# Patient Record
Sex: FEMALE | Race: OTHER | NOT HISPANIC OR LATINO | Employment: UNEMPLOYED | URBAN - METROPOLITAN AREA
[De-identification: names, ages, dates, MRNs, and addresses within clinical notes are randomized per-mention and may not be internally consistent; named-entity substitution may affect disease eponyms.]

---

## 2022-01-27 ENCOUNTER — TELEPHONE (OUTPATIENT)
Dept: ADMINISTRATIVE | Facility: OTHER | Age: 27
End: 2022-01-27
Payer: MEDICAID

## 2022-01-28 ENCOUNTER — OFFICE VISIT (OUTPATIENT)
Dept: OBSTETRICS AND GYNECOLOGY | Facility: CLINIC | Age: 27
End: 2022-01-28
Payer: MEDICAID

## 2022-01-28 ENCOUNTER — HOSPITAL ENCOUNTER (INPATIENT)
Facility: HOSPITAL | Age: 27
LOS: 2 days | Discharge: HOME | End: 2022-01-30
Attending: OBSTETRICS & GYNECOLOGY | Admitting: OBSTETRICS & GYNECOLOGY
Payer: MEDICAID

## 2022-01-28 ENCOUNTER — ANESTHESIA EVENT (OUTPATIENT)
Dept: OBSTETRICS AND GYNECOLOGY | Facility: HOSPITAL | Age: 27
End: 2022-01-28
Payer: MEDICAID

## 2022-01-28 ENCOUNTER — ANESTHESIA (OUTPATIENT)
Dept: OBSTETRICS AND GYNECOLOGY | Facility: HOSPITAL | Age: 27
End: 2022-01-28
Payer: MEDICAID

## 2022-01-28 VITALS — DIASTOLIC BLOOD PRESSURE: 80 MMHG | SYSTOLIC BLOOD PRESSURE: 120 MMHG | WEIGHT: 135.81 LBS

## 2022-01-28 DIAGNOSIS — Z12.4 CERVICAL CANCER SCREENING: ICD-10-CM

## 2022-01-28 DIAGNOSIS — Z3A.39 39 WEEKS GESTATION OF PREGNANCY: ICD-10-CM

## 2022-01-28 DIAGNOSIS — Z3A.35 35 WEEKS GESTATION OF PREGNANCY: Primary | ICD-10-CM

## 2022-01-28 LAB
ABO + RH BLD: NORMAL
BLD GP AB SCN CELLS X3 SERPL QL: NORMAL
HIV1+2 IGG SERPL QL IA.RAPID: NORMAL
SARS-COV-2 RDRP RESP QL NAA+PROBE: NEGATIVE

## 2022-01-28 PROCEDURE — 63600175 PHARM REV CODE 636 W HCPCS: Performed by: OBSTETRICS & GYNECOLOGY

## 2022-01-28 PROCEDURE — 59514 PR CESAREAN DELIVERY ONLY: ICD-10-PCS | Mod: GB,,, | Performed by: OBSTETRICS & GYNECOLOGY

## 2022-01-28 PROCEDURE — 87088 URINE BACTERIA CULTURE: CPT | Performed by: OBSTETRICS & GYNECOLOGY

## 2022-01-28 PROCEDURE — 37000009 HC ANESTHESIA EA ADD 15 MINS: Performed by: OBSTETRICS & GYNECOLOGY

## 2022-01-28 PROCEDURE — 99204 OFFICE O/P NEW MOD 45 MIN: CPT | Mod: S$PBB,TH,, | Performed by: OBSTETRICS & GYNECOLOGY

## 2022-01-28 PROCEDURE — 88141 PR  CYTOPATH CERV/VAG INTERPRET: ICD-10-PCS | Mod: ,,, | Performed by: PATHOLOGY

## 2022-01-28 PROCEDURE — 87801 DETECT AGNT MULT DNA AMPLI: CPT | Performed by: OBSTETRICS & GYNECOLOGY

## 2022-01-28 PROCEDURE — 3074F PR MOST RECENT SYSTOLIC BLOOD PRESSURE < 130 MM HG: ICD-10-PCS | Mod: CPTII,,, | Performed by: OBSTETRICS & GYNECOLOGY

## 2022-01-28 PROCEDURE — 25000003 PHARM REV CODE 250: Performed by: STUDENT IN AN ORGANIZED HEALTH CARE EDUCATION/TRAINING PROGRAM

## 2022-01-28 PROCEDURE — 86592 SYPHILIS TEST NON-TREP QUAL: CPT | Mod: 91 | Performed by: OBSTETRICS & GYNECOLOGY

## 2022-01-28 PROCEDURE — 62322 NJX INTERLAMINAR LMBR/SAC: CPT | Performed by: STUDENT IN AN ORGANIZED HEALTH CARE EDUCATION/TRAINING PROGRAM

## 2022-01-28 PROCEDURE — 3074F SYST BP LT 130 MM HG: CPT | Mod: CPTII,,, | Performed by: OBSTETRICS & GYNECOLOGY

## 2022-01-28 PROCEDURE — 88175 CYTOPATH C/V AUTO FLUID REDO: CPT | Performed by: PATHOLOGY

## 2022-01-28 PROCEDURE — 59514 CESAREAN DELIVERY ONLY: CPT | Mod: GB,,, | Performed by: OBSTETRICS & GYNECOLOGY

## 2022-01-28 PROCEDURE — 99999 PR PBB SHADOW E&M-EST. PATIENT-LVL II: CPT | Mod: PBBFAC,,, | Performed by: OBSTETRICS & GYNECOLOGY

## 2022-01-28 PROCEDURE — 3079F PR MOST RECENT DIASTOLIC BLOOD PRESSURE 80-89 MM HG: ICD-10-PCS | Mod: CPTII,,, | Performed by: OBSTETRICS & GYNECOLOGY

## 2022-01-28 PROCEDURE — 27200688 HC TRAY, SPINAL-HYPER/ ISOBARIC: Performed by: ANESTHESIOLOGY

## 2022-01-28 PROCEDURE — 51702 INSERT TEMP BLADDER CATH: CPT

## 2022-01-28 PROCEDURE — 88141 CYTOPATH C/V INTERPRET: CPT | Mod: ,,, | Performed by: PATHOLOGY

## 2022-01-28 PROCEDURE — 99212 OFFICE O/P EST SF 10 MIN: CPT | Mod: PBBFAC,TH,PO | Performed by: OBSTETRICS & GYNECOLOGY

## 2022-01-28 PROCEDURE — 25000003 PHARM REV CODE 250

## 2022-01-28 PROCEDURE — 3044F PR MOST RECENT HEMOGLOBIN A1C LEVEL <7.0%: ICD-10-PCS | Mod: CPTII,,, | Performed by: OBSTETRICS & GYNECOLOGY

## 2022-01-28 PROCEDURE — 87077 CULTURE AEROBIC IDENTIFY: CPT | Performed by: OBSTETRICS & GYNECOLOGY

## 2022-01-28 PROCEDURE — 25000003 PHARM REV CODE 250: Performed by: OBSTETRICS & GYNECOLOGY

## 2022-01-28 PROCEDURE — U0002 COVID-19 LAB TEST NON-CDC: HCPCS | Performed by: OBSTETRICS & GYNECOLOGY

## 2022-01-28 PROCEDURE — 36004725 HC OB OR TIME LEV III - EA ADD 15 MIN: Performed by: OBSTETRICS & GYNECOLOGY

## 2022-01-28 PROCEDURE — 87186 SC STD MICRODIL/AGAR DIL: CPT | Performed by: OBSTETRICS & GYNECOLOGY

## 2022-01-28 PROCEDURE — 86850 RBC ANTIBODY SCREEN: CPT | Performed by: OBSTETRICS & GYNECOLOGY

## 2022-01-28 PROCEDURE — 87491 CHLMYD TRACH DNA AMP PROBE: CPT | Mod: 59 | Performed by: OBSTETRICS & GYNECOLOGY

## 2022-01-28 PROCEDURE — 3079F DIAST BP 80-89 MM HG: CPT | Mod: CPTII,,, | Performed by: OBSTETRICS & GYNECOLOGY

## 2022-01-28 PROCEDURE — 87086 URINE CULTURE/COLONY COUNT: CPT | Performed by: OBSTETRICS & GYNECOLOGY

## 2022-01-28 PROCEDURE — 36415 COLL VENOUS BLD VENIPUNCTURE: CPT | Performed by: OBSTETRICS & GYNECOLOGY

## 2022-01-28 PROCEDURE — 63600175 PHARM REV CODE 636 W HCPCS: Performed by: STUDENT IN AN ORGANIZED HEALTH CARE EDUCATION/TRAINING PROGRAM

## 2022-01-28 PROCEDURE — 86762 RUBELLA ANTIBODY: CPT | Performed by: OBSTETRICS & GYNECOLOGY

## 2022-01-28 PROCEDURE — 3044F HG A1C LEVEL LT 7.0%: CPT | Mod: CPTII,,, | Performed by: OBSTETRICS & GYNECOLOGY

## 2022-01-28 PROCEDURE — 11000001 HC ACUTE MED/SURG PRIVATE ROOM

## 2022-01-28 PROCEDURE — 87624 HPV HI-RISK TYP POOLED RSLT: CPT | Performed by: OBSTETRICS & GYNECOLOGY

## 2022-01-28 PROCEDURE — 87340 HEPATITIS B SURFACE AG IA: CPT | Performed by: OBSTETRICS & GYNECOLOGY

## 2022-01-28 PROCEDURE — 87481 CANDIDA DNA AMP PROBE: CPT | Mod: 59 | Performed by: OBSTETRICS & GYNECOLOGY

## 2022-01-28 PROCEDURE — 37000008 HC ANESTHESIA 1ST 15 MINUTES: Performed by: OBSTETRICS & GYNECOLOGY

## 2022-01-28 PROCEDURE — 87591 N.GONORRHOEAE DNA AMP PROB: CPT | Mod: 59 | Performed by: OBSTETRICS & GYNECOLOGY

## 2022-01-28 PROCEDURE — 99204 PR OFFICE/OUTPT VISIT, NEW, LEVL IV, 45-59 MIN: ICD-10-PCS | Mod: S$PBB,TH,, | Performed by: OBSTETRICS & GYNECOLOGY

## 2022-01-28 PROCEDURE — 86703 HIV-1/HIV-2 1 RESULT ANTBDY: CPT | Performed by: OBSTETRICS & GYNECOLOGY

## 2022-01-28 PROCEDURE — 99999 PR PBB SHADOW E&M-EST. PATIENT-LVL II: ICD-10-PCS | Mod: PBBFAC,,, | Performed by: OBSTETRICS & GYNECOLOGY

## 2022-01-28 PROCEDURE — 36004724 HC OB OR TIME LEV III - 1ST 15 MIN: Performed by: OBSTETRICS & GYNECOLOGY

## 2022-01-28 RX ORDER — DIPHENHYDRAMINE HCL 25 MG
25 CAPSULE ORAL EVERY 4 HOURS PRN
Status: DISCONTINUED | OUTPATIENT
Start: 2022-01-28 | End: 2022-01-30 | Stop reason: HOSPADM

## 2022-01-28 RX ORDER — ADHESIVE BANDAGE
30 BANDAGE TOPICAL 2 TIMES DAILY PRN
Status: DISCONTINUED | OUTPATIENT
Start: 2022-01-29 | End: 2022-01-30 | Stop reason: HOSPADM

## 2022-01-28 RX ORDER — OXYTOCIN/RINGER'S LACTATE 30/500 ML
95 PLASTIC BAG, INJECTION (ML) INTRAVENOUS ONCE
Status: DISCONTINUED | OUTPATIENT
Start: 2022-01-28 | End: 2022-01-30 | Stop reason: HOSPADM

## 2022-01-28 RX ORDER — OXYTOCIN/RINGER'S LACTATE 30/500 ML
95 PLASTIC BAG, INJECTION (ML) INTRAVENOUS ONCE
Status: DISCONTINUED | OUTPATIENT
Start: 2022-01-28 | End: 2022-01-28

## 2022-01-28 RX ORDER — CARBOPROST TROMETHAMINE 250 UG/ML
250 INJECTION, SOLUTION INTRAMUSCULAR
Status: DISCONTINUED | OUTPATIENT
Start: 2022-01-28 | End: 2022-01-28

## 2022-01-28 RX ORDER — PROCHLORPERAZINE EDISYLATE 5 MG/ML
5 INJECTION INTRAMUSCULAR; INTRAVENOUS EVERY 6 HOURS PRN
Status: DISCONTINUED | OUTPATIENT
Start: 2022-01-28 | End: 2022-01-30 | Stop reason: HOSPADM

## 2022-01-28 RX ORDER — DOCUSATE SODIUM 100 MG/1
200 CAPSULE, LIQUID FILLED ORAL 2 TIMES DAILY
Status: DISCONTINUED | OUTPATIENT
Start: 2022-01-28 | End: 2022-01-30 | Stop reason: HOSPADM

## 2022-01-28 RX ORDER — OXYCODONE HYDROCHLORIDE 5 MG/1
10 TABLET ORAL EVERY 4 HOURS PRN
Status: ACTIVE | OUTPATIENT
Start: 2022-01-28 | End: 2022-01-29

## 2022-01-28 RX ORDER — FAMOTIDINE 10 MG/ML
20 INJECTION INTRAVENOUS
Status: DISCONTINUED | OUTPATIENT
Start: 2022-01-28 | End: 2022-01-28

## 2022-01-28 RX ORDER — SODIUM CITRATE AND CITRIC ACID MONOHYDRATE 334; 500 MG/5ML; MG/5ML
30 SOLUTION ORAL ONCE
Status: DISCONTINUED | OUTPATIENT
Start: 2022-01-28 | End: 2022-01-28

## 2022-01-28 RX ORDER — FAMOTIDINE 10 MG/ML
20 INJECTION INTRAVENOUS ONCE
Status: DISCONTINUED | OUTPATIENT
Start: 2022-01-28 | End: 2022-01-28

## 2022-01-28 RX ORDER — ONDANSETRON 2 MG/ML
4 INJECTION INTRAMUSCULAR; INTRAVENOUS EVERY 6 HOURS PRN
Status: ACTIVE | OUTPATIENT
Start: 2022-01-28 | End: 2022-01-29

## 2022-01-28 RX ORDER — SODIUM CHLORIDE 9 MG/ML
INJECTION, SOLUTION INTRAVENOUS CONTINUOUS
Status: DISCONTINUED | OUTPATIENT
Start: 2022-01-28 | End: 2022-01-30 | Stop reason: HOSPADM

## 2022-01-28 RX ORDER — ONDANSETRON HYDROCHLORIDE 2 MG/ML
INJECTION, SOLUTION INTRAMUSCULAR; INTRAVENOUS
Status: DISCONTINUED | OUTPATIENT
Start: 2022-01-28 | End: 2022-01-28

## 2022-01-28 RX ORDER — MORPHINE SULFATE 4 MG/ML
2 INJECTION, SOLUTION INTRAMUSCULAR; INTRAVENOUS
Status: ACTIVE | OUTPATIENT
Start: 2022-01-28 | End: 2022-01-29

## 2022-01-28 RX ORDER — CEFAZOLIN SODIUM 2 G/50ML
2 SOLUTION INTRAVENOUS
Status: COMPLETED | OUTPATIENT
Start: 2022-01-28 | End: 2022-01-28

## 2022-01-28 RX ORDER — KETOROLAC TROMETHAMINE 30 MG/ML
INJECTION, SOLUTION INTRAMUSCULAR; INTRAVENOUS
Status: DISCONTINUED | OUTPATIENT
Start: 2022-01-28 | End: 2022-01-28

## 2022-01-28 RX ORDER — MUPIROCIN 20 MG/G
OINTMENT TOPICAL
Status: DISCONTINUED | OUTPATIENT
Start: 2022-01-28 | End: 2022-01-30 | Stop reason: HOSPADM

## 2022-01-28 RX ORDER — FENTANYL CITRATE 50 UG/ML
INJECTION, SOLUTION INTRAMUSCULAR; INTRAVENOUS
Status: DISCONTINUED | OUTPATIENT
Start: 2022-01-28 | End: 2022-01-28

## 2022-01-28 RX ORDER — NALOXONE HCL 0.4 MG/ML
0.04 VIAL (ML) INJECTION EVERY 5 MIN PRN
Status: DISCONTINUED | OUTPATIENT
Start: 2022-01-28 | End: 2022-01-30 | Stop reason: HOSPADM

## 2022-01-28 RX ORDER — BUPIVACAINE HYDROCHLORIDE 7.5 MG/ML
INJECTION, SOLUTION INTRASPINAL
Status: DISCONTINUED | OUTPATIENT
Start: 2022-01-28 | End: 2022-01-28

## 2022-01-28 RX ORDER — BISACODYL 10 MG
10 SUPPOSITORY, RECTAL RECTAL ONCE AS NEEDED
Status: DISCONTINUED | OUTPATIENT
Start: 2022-01-28 | End: 2022-01-30 | Stop reason: HOSPADM

## 2022-01-28 RX ORDER — HYDROMORPHONE HYDROCHLORIDE 1 MG/ML
1 INJECTION, SOLUTION INTRAMUSCULAR; INTRAVENOUS; SUBCUTANEOUS EVERY 4 HOURS PRN
Status: CANCELLED | OUTPATIENT
Start: 2022-01-28

## 2022-01-28 RX ORDER — SODIUM CHLORIDE, SODIUM LACTATE, POTASSIUM CHLORIDE, CALCIUM CHLORIDE 600; 310; 30; 20 MG/100ML; MG/100ML; MG/100ML; MG/100ML
INJECTION, SOLUTION INTRAVENOUS CONTINUOUS
Status: DISCONTINUED | OUTPATIENT
Start: 2022-01-28 | End: 2022-01-28

## 2022-01-28 RX ORDER — MUPIROCIN 20 MG/G
OINTMENT TOPICAL
Status: COMPLETED
Start: 2022-01-28 | End: 2022-01-28

## 2022-01-28 RX ORDER — MORPHINE SULFATE 1 MG/ML
INJECTION, SOLUTION EPIDURAL; INTRATHECAL; INTRAVENOUS
Status: DISCONTINUED | OUTPATIENT
Start: 2022-01-28 | End: 2022-01-28

## 2022-01-28 RX ORDER — MISOPROSTOL 200 UG/1
800 TABLET ORAL
Status: DISCONTINUED | OUTPATIENT
Start: 2022-01-28 | End: 2022-01-28

## 2022-01-28 RX ORDER — DIPHENHYDRAMINE HYDROCHLORIDE 50 MG/ML
12.5 INJECTION INTRAMUSCULAR; INTRAVENOUS
Status: DISCONTINUED | OUTPATIENT
Start: 2022-01-28 | End: 2022-01-30 | Stop reason: HOSPADM

## 2022-01-28 RX ORDER — KETOROLAC TROMETHAMINE 30 MG/ML
30 INJECTION, SOLUTION INTRAMUSCULAR; INTRAVENOUS EVERY 6 HOURS
Status: DISCONTINUED | OUTPATIENT
Start: 2022-01-28 | End: 2022-01-28

## 2022-01-28 RX ORDER — AMOXICILLIN 250 MG
1 CAPSULE ORAL NIGHTLY PRN
Status: CANCELLED | OUTPATIENT
Start: 2022-01-28

## 2022-01-28 RX ORDER — ONDANSETRON 8 MG/1
8 TABLET, ORALLY DISINTEGRATING ORAL EVERY 8 HOURS PRN
Status: DISCONTINUED | OUTPATIENT
Start: 2022-01-28 | End: 2022-01-30 | Stop reason: HOSPADM

## 2022-01-28 RX ORDER — SODIUM CITRATE AND CITRIC ACID MONOHYDRATE 334; 500 MG/5ML; MG/5ML
30 SOLUTION ORAL
Status: DISCONTINUED | OUTPATIENT
Start: 2022-01-28 | End: 2022-01-28

## 2022-01-28 RX ORDER — OXYCODONE HYDROCHLORIDE 5 MG/1
5 TABLET ORAL EVERY 4 HOURS PRN
Status: DISPENSED | OUTPATIENT
Start: 2022-01-28 | End: 2022-01-29

## 2022-01-28 RX ORDER — OXYTOCIN/RINGER'S LACTATE 30/500 ML
334 PLASTIC BAG, INJECTION (ML) INTRAVENOUS ONCE
Status: COMPLETED | OUTPATIENT
Start: 2022-01-28 | End: 2022-01-28

## 2022-01-28 RX ORDER — METHYLERGONOVINE MALEATE 0.2 MG/ML
200 INJECTION INTRAVENOUS
Status: DISCONTINUED | OUTPATIENT
Start: 2022-01-28 | End: 2022-01-28

## 2022-01-28 RX ORDER — ACETAMINOPHEN 325 MG/1
650 TABLET ORAL EVERY 6 HOURS
Status: DISPENSED | OUTPATIENT
Start: 2022-01-28 | End: 2022-01-29

## 2022-01-28 RX ADMIN — OXYCODONE 5 MG: 5 TABLET ORAL at 05:01

## 2022-01-28 RX ADMIN — DOCUSATE SODIUM 200 MG: 100 CAPSULE ORAL at 09:01

## 2022-01-28 RX ADMIN — MORPHINE SULFATE 0.15 MG: 1 INJECTION, SOLUTION EPIDURAL; INTRATHECAL; INTRAVENOUS at 01:01

## 2022-01-28 RX ADMIN — FAMOTIDINE 20 MG: 10 INJECTION, SOLUTION INTRAVENOUS at 01:01

## 2022-01-28 RX ADMIN — SODIUM CHLORIDE, SODIUM LACTATE, POTASSIUM CHLORIDE, AND CALCIUM CHLORIDE 1000 ML: .6; .31; .03; .02 INJECTION, SOLUTION INTRAVENOUS at 05:01

## 2022-01-28 RX ADMIN — KETOROLAC TROMETHAMINE 30 MG: 30 INJECTION, SOLUTION INTRAMUSCULAR; INTRAVENOUS at 01:01

## 2022-01-28 RX ADMIN — PHENYLEPHRINE HYDROCHLORIDE 50 MCG/MIN: 10 INJECTION INTRAVENOUS at 01:01

## 2022-01-28 RX ADMIN — Medication 334 MILLI-UNITS/MIN: at 01:01

## 2022-01-28 RX ADMIN — SODIUM CITRATE AND CITRIC ACID MONOHYDRATE 30 ML: 500; 334 SOLUTION ORAL at 01:01

## 2022-01-28 RX ADMIN — SODIUM CHLORIDE: 0.9 INJECTION, SOLUTION INTRAVENOUS at 09:01

## 2022-01-28 RX ADMIN — ONDANSETRON 4 MG: 2 INJECTION, SOLUTION INTRAMUSCULAR; INTRAVENOUS at 01:01

## 2022-01-28 RX ADMIN — SODIUM CHLORIDE, SODIUM LACTATE, POTASSIUM CHLORIDE, AND CALCIUM CHLORIDE: .6; .31; .03; .02 INJECTION, SOLUTION INTRAVENOUS at 01:01

## 2022-01-28 RX ADMIN — MUPIROCIN: 20 OINTMENT TOPICAL at 01:01

## 2022-01-28 RX ADMIN — BUPIVACAINE HYDROCHLORIDE 1.5 ML: 7.5 INJECTION, SOLUTION SUBARACHNOID at 01:01

## 2022-01-28 RX ADMIN — ACETAMINOPHEN 650 MG: 325 TABLET ORAL at 09:01

## 2022-01-28 RX ADMIN — CEFAZOLIN SODIUM 2 G: 2 SOLUTION INTRAVENOUS at 01:01

## 2022-01-28 RX ADMIN — FENTANYL CITRATE 15 MCG: 50 INJECTION, SOLUTION INTRAMUSCULAR; INTRAVENOUS at 01:01

## 2022-01-28 RX ADMIN — DEXTROSE 2 G: 50 INJECTION, SOLUTION INTRAVENOUS at 01:01

## 2022-01-28 NOTE — PLAN OF CARE
1245 SVE /3, bloody show noted, Dr Henderson paged to request moving  up, called back able to go back as soon as pt is ready, anesthesia at bedside, IV started, nitin-op meds given, pt moved to OR at 1310, see operative report an delivery summary.     1415 Report received from anesthesia, recovery plan reviewed with pt using video int. [id number], pt stes she will rest and see baby later tonight, Rn instructed pt on call light , will notify Rn if she needs anything or ready to visit the baby.

## 2022-01-28 NOTE — PROGRESS NOTES
OBSTETRICS /GYNECOLOGY     CC: Patient here for prenatal care .        LMP =unk    Brings US picture   21=>  7 3/7 WGA   Today 39 6/7 wga     Tiffanie Lopez is a 26 y.o. female  presents with complaint of absence of menstruation.      Fetal movements: Yes  Abdominal cramping:  Yes  Vaginal Bleeding:  No  Leakage of Fluid:  No  Passage of tissue:  No  Breast Tenderness:  No  Nausea/Vomiting:  No    Risks/History is pertinent for no prenatal care         History reviewed. No pertinent past medical history.  History reviewed. No pertinent surgical history.  Social History     Socioeconomic History    Marital status: Single   Tobacco Use    Smoking status: Never Smoker    Smokeless tobacco: Never Used   Substance and Sexual Activity    Alcohol use: Not Currently    Drug use: Never    Sexual activity: Not Currently     Partners: Male     History reviewed. No pertinent family history.  OB History    Para Term  AB Living   2 1       1   SAB IAB Ectopic Multiple Live Births           1      # Outcome Date GA Lbr Gilberto/2nd Weight Sex Delivery Anes PTL Lv   2 Current            1 Para      Vag-Spont   NEO       /80   Wt 61.6 kg (135 lb 12.9 oz)   LMP  (Approximate)     ROS:  GENERAL: Denies weight gain or weight loss. Feeling well overall.   SKIN: Denies rash or lesions.   HEAD: Denies head injury or headache.   NODES: Denies enlarged lymph nodes.   CHEST: Denies chest pain or shortness of breath.   CARDIOVASCULAR: Denies palpitations or left sided chest pain.   ABDOMEN: No abdominal pain, constipation, diarrhea, nausea, vomiting or rectal bleeding.   URINARY: No frequency, dysuria, hematuria, or burning on urination.  REPRODUCTIVE: See HPI.   BREASTS: The patient performs breast self-examination and denies pain, lumps, or nipple discharge.   HEMATOLOGIC: No easy bruisability or excessive bleeding.   MUSCULOSKELETAL: Denies joint pain or swelling.   NEUROLOGIC: Denies syncope or  weakness.   PSYCHIATRIC: Denies depression, anxiety or mood swings.    PE:   APPEARANCE: Well nourished, well developed, in no acute distress.  AFFECT: WNL, alert and oriented x 3.  SKIN: No acne or hirsutism.  NECK: Neck symmetric without masses or thyromegaly.  NODES: No inguinal, cervical, axillary or femoral lymph node enlargement.  CHEST: Good respiratory effort.   ABDOMEN: Soft. No tenderness.   Fundal Height : 34  FHT present     BREASTS: Symmetrical, no skin changes or visible lesions. No palpable masses, nipple discharge bilaterally.  PELVIC:   SSE- normal vaginal and cervix. No  significant vaginal discharge   SVE = closed , long   EXTREMITIES: No edema.          ASSESSMENT and PLAN:    1-Intrauterine pregnancy  2-Late transfer prenatal care  3- Minimal prenatal care   4- Breech presentation   5= labor     Prenatal Labs  Dating US  GC/CT  Affirm   PAP    Patient was counseled today on proper weight gain based on the [place] [facility]'s recommendations based on her pre-pregnancy weight. Discussed foods to avoid in pregnancy (i.e. sushi, fish that are high in mercury, deli meat, and unpasteurized cheeses). Discussed prenatal vitamin options (i.e. stool softener, DHA). Contingency screen offered - patient desires.  Discussed: Common complaints of pregnancy, HIV and other routine prenatal tests, Tobacco abuse, risk factors identified by prenatal history, Anticipated course of prenatal careNutrition and weight gain counseling,Toxoplasmosis precautions (Cats/Raw Meat) Exercise, Alcohol, Illicit/Recreational Drugs, Seat belt use, Childbirth classes/Hospital facilities.   Discussed nonpharmacological pain relief methods for labor, techniques and benefits of effective breastfeeding position and latch, and basic breastfeeding management. Encouraged patient to attend [facility]s Prenatal Breastfeeding Class and to download [facility] My Mychart  mobile juani if she has not already done so. Patient verbalizes  understanding.      Sent to L&D  For Delivery         I spent a total of 30 minutes on the day of the visit.This includes face to face time and non-face to face time preparing to see the patient (eg, review of tests), Obtaining and/or reviewing separately obtained history, Documenting clinical information in the electronic or other health record, Independently interpreting resultsand communicating results to the patient/family/caregiver, or Care coordination.      Micha Henderson M.D.   OB/GYN    1/28/2022

## 2022-01-28 NOTE — ANESTHESIA PROCEDURE NOTES
Spinal    Diagnosis: C section   Patient location during procedure: OB  Start time: 1/28/2022 1:16 PM  Timeout: 1/28/2022 1:15 PM  End time: 1/28/2022 1:21 PM    Staffing  Authorizing Provider: Corinne C. Weinstein, MD  Performing Provider: Dioni eSrrano MD    Preanesthetic Checklist  Completed: patient identified, IV checked, site marked, risks and benefits discussed, surgical consent, monitors and equipment checked, pre-op evaluation and timeout performed  Spinal Block  Patient position: sitting  Prep: ChloraPrep  Patient monitoring: heart rate, continuous pulse ox and frequent blood pressure checks  Approach: midline  Location: L3-4  Injection technique: single shot  CSF Fluid: clear free-flowing CSF  Needle  Needle type: pencil-tip   Needle gauge: 25 G  Needle length: 3.5 in  Additional Documentation: incremental injection, negative aspiration for heme and no paresthesia on injection  Needle localization: anatomical landmarks  Assessment  Sensory level: T5   Dermatomal levels determined by pinch or prick  Ease of block: easy  Patient's tolerance of the procedure: comfortable throughout block and no complaints  Additional Notes  1.5cc 0.75% hyperbaric bupivacaine used for spinal dose

## 2022-01-28 NOTE — PLAN OF CARE
BALTA received a call from adalberto Williamson [phone] advising the patient has an active adoption with [facility] [employer]. Pt's has chosen not to have contact with  and has requested  room in and bond with adoptive parents Annetta and Mark Kulkarni.     Ms. Williamson informed SW she would email pt's hospital preference sheet to provide to nursing staff.     2:54 BALTA spoke with manager of nursing unit Dacia BLANCO to inform of request that adoptive parents room in with pt's . At this time the floor is full with patients and space is not available. BALTA advised Dacia that  Teri will be informed and can provide information to adoptive parents.     3:06 SW contact adalberto Williamson to advise. Ms. Williamson verbalized understanding and requested SW check email for pt's adoption  hospital plan.     SW obtained adoption hospital plan and delivered to NICU/Nursery to ALEAH Vazquez.        Update: BALTA received call from [facility] requesting documentation of pt's mother surrendering rights or additional documentation that allows adoptive parents to visit with patient.     BALTA contacted adalberto Williamson to request any additional documentation available from pt's mother. Ms. Williamson advised the hospital plan should be enough to allow the adoptive parent Annetta ( who is currently in the waiting room awaiting to see the )  access to the . Pt is unable to surrender rights to the  until three days after birth.     BALTA made attempt of telephonic contact with [facility] to advise. BALTA left message with nursing staff and requested a call back.       SW to follow up.

## 2022-01-28 NOTE — PLAN OF CARE
1200 Pt arrives on the unit for a scheduled  due to ctx in office 3-4cm via Dr Henderson, , history of , currently in transverse position, video int used [id number] reviewed all history, medicine, educated on spinal and , during admit Pt states support person is , open adoption planned, parents will be coming,  will attend . Pt can recall all information. Will get NST and then pt will shower with solution, and then start IV.

## 2022-01-28 NOTE — ANESTHESIA PREPROCEDURE EVALUATION
2022  Tiffanie Lopez is a 26 y.o., female , prior . Scheduled for C section 2/2 transverse positioning.     Anesthesia Evaluation    I have reviewed the Patient Summary Reports.    I have reviewed the Nursing Notes.       Review of Systems  Anesthesia Hx:  No problems with previous Anesthesia    EENT/Dental:EENT/Dental Normal   Cardiovascular:  Cardiovascular Normal     Pulmonary:  Pulmonary Normal    Renal/:  Renal/ Normal     Hepatic/GI:  Hepatic/GI Normal    Musculoskeletal:  Musculoskeletal Normal    Neurological:  Neurology Normal    Endocrine:  Endocrine Normal        Physical Exam  General:  Well nourished    Airway/Jaw/Neck:  Airway Findings: Mouth Opening: Normal Tongue: Normal  General Airway Assessment: Adult  Mallampati: II  TM Distance: 4 - 6 cm         Dental:  DENTAL FINDINGS: Normal   Chest/Lungs:  Chest/Lungs Clear    Heart/Vascular:  Heart Findings: Normal       Mental Status:  Mental Status Findings:  Cooperative, Alert and Oriented         Anesthesia Plan  Type of Anesthesia, risks & benefits discussed:  Anesthesia Type:  spinal, general    Patient's Preference:   Plan Factors:          Intra-op Monitoring Plan: standard ASA monitors  Intra-op Monitoring Plan Comments:   Post Op Pain Control Plan: multimodal analgesia  Post Op Pain Control Plan Comments:     Induction:   IV  Beta Blocker:  Patient is not currently on a Beta-Blocker (No further documentation required).       Informed Consent: Patient understands risks and agrees with Anesthesia plan.  Questions answered. Anesthesia consent signed with patient.  ASA Score: 2     Day of Surgery Review of History & Physical: I have interviewed and examined the patient. I have reviewed the patient's H&P dated:  There are no significant changes.          Ready For Surgery From Anesthesia Perspective.

## 2022-01-28 NOTE — PLAN OF CARE
BALTA received a call from  Ruth Williamson [phone] requesting update on adoptive parent Annetta March access to see .     BALTA contacted [facility] to inquire on additional information needed so pt's adoptive parent Annetta March can see pt's . SW was advised documentation from the  reporting a pending adoption is needed to provide clearance for adoptive parent Annetta March to see the .     BALTA contacted  Ruth Williamson and advised of request. Ms. Williamson informed BALTA she will email documentation and call SW back to confirm receipt.       SW to follow up.

## 2022-01-28 NOTE — H&P
UPDATED HISTORY AND PHYSICAL        2022  Subjective:       Tiffanie Lopez is a 26 y.o.  female   with IUP at 39w2d weeks gestation who presents for scheduled for primary   delivery today   Breech delivery in Labor   No prenatal care   Contractions q 10 minutes since last night   Found with cervical dilatation at the clinic     Brings a picture of early  US= 21=> 7 3/7 WGA  Today = 38 6/7 wga   LMP unk  .  This IUP is complicated by no prenatal care   .    Patient reports contractions, denies vaginal bleeding, denies LOF.   Fetal Movement: normal.     PMHx: No past medical history on file.    PSHx: No past surgical history on file.    All: Review of patient's allergies indicates:  No Known Allergies    Meds:   No medications prior to admission.       SH:   Social History     Socioeconomic History    Marital status: Single   Tobacco Use    Smoking status: Never Smoker    Smokeless tobacco: Never Used   Substance and Sexual Activity    Alcohol use: Not Currently    Drug use: Never    Sexual activity: Not Currently     Partners: Male       FH: No family history on file.    OBHx:   OB History    Para Term  AB Living   2 1 0 0 0 1   SAB IAB Ectopic Multiple Live Births   0 0 0 0 1      # Outcome Date GA Lbr Gilberto/2nd Weight Sex Delivery Anes PTL Lv   2 Current            1 Para      Vag-Spont   NEO       Review of Systems: Non contributory      Objective:       There were no vitals taken for this visit.    There were no vitals filed for this visit.    General:   alert, appears stated age, cooperative, no distress and mild distress   Lungs:   clear to auscultation bilaterally   Heart:   regular rate and rhythm, S1, S2 normal, no murmur, click, rub or gallop   Abdomen:  soft, non-tender; bowel sounds normal; no masses,  no organomegaly   Extremities negative edema, negative erythema   FHT: - Cat 0 (-)                 TOCO: Q - minutes   Presentations: breech by ultrasound    Cervix:     Dilation: 5- cms     Effacement: 60%    Station:  -3    Consistency:     Position:      Breech presentation confirmed with US     Lab Review  Blood Type        Assessment:       39w2d weeks gestation.  Breech presentation   Labor    There is no problem list on file for this patient.         Plan:      Risks, benefits, alternatives and possible complications have been discussed in detail with the patient.   - Consents signed and to chart  - Admit to Labor and Delivery unit  Will proceed with primary C/S     Patient agrees with plan      Prenatal labs  Ordered             Micha Henderson M.D.   OB/GYN    1/28/2022

## 2022-01-28 NOTE — L&D DELIVERY NOTE
[facility]   Section   Operative Note    OPERATIVE NOTE            Surgery Date: 2022     SURGEON: Micha Henderson   ASSISTANT: Sana horton                           PREOP DIAGNOSIS: IUP @ 39 WGA                                        Breech presentation                                        Labor     POSTOP DIAGNOSIS:  -                                         -    PROCEDURES:  Low transverse  section   ANESTHESIA: spinal    FINDINGS/KEY COMPONENTS:   One viable  sex   F    Apgar 9/9  Normal  ,placenta , clear amniotic fluid  Normal  Uterus, tubes and ovaries     ESTIMATED BLOOD LOSS: 350cc    UOP:   Urine clear at the end of procedure     COMPLICATIONS: None    SPECIMEN / PATHOLOGY:   Specimens     None        PROCEDURE:    The patient was taken to the operating room where spinal anesthesia was found to be adequate.  She was prepped and draped in the normal sterile fashion.  Pfannensteil skin incision was made with the scalpel and carried down to the lower layer of fascia with the scalpel.  The fascia was incised in the mid-line.  This incision was extended laterally with the coburn scissors.  The superior aspect of the incision was grasped with [facility] clamps, tented up, and the underlying muscles were dissected off bluntly.  The inferior aspect of the incision was grasped with the [facility] clamps and the underlying muscles were dissected off bluntly.  The muscles were  in the mid-line.  The peritoneum was grasped with hemostats and entered in bluntly.  This incision was extended superiorly and inferiorly with good visualization of the bladder. Bladder flap was done, with Metzenbaum scissors, and bladder was pushed down exposing the  uterine segment.    The bladder blade was inserted.  A low transverse incision was made on the uterus.  This incision was extended laterally with finger fracture.  Presentation was complete breech   The infant  was delivered  atraumatically.  The cord was clamped and cut.  The infant was handed to the waiting nurse. Umbilical cord blood sample obtained   The placenta was delivered manually   The uterus was exteriorized and cleared of all clots and debris.  The uterine incision was repaired with #1 Chromic in a running, locked fashion.  Excellent hemostasis was noted.  The posterior gutter was cleared of all clots and debris.  The uterus was returned to the abdomen.  The incision was again inspected and found to be hemostatic.  The peritoneum was reapproximated with 2-0 Vicryl in a running fashion.  The muscle was reapproximated in a mattress fashion with #1 Chromic.  The fascia was reapproximated with #1 PDS in a running fashion.  The subcutaneous tissue was reapproximated with 2-0 Plain catgut .  Skin was closed with ( -) staples / (x)  4-0 Monocryl in a subcuticular fashion.    Sponge, lap, needle counts were correct x 2.  Patient was taken to the recover room awake and in stable condition with the Siddiqui draining clear urine.      Micha Henderson M.D.   OB/GYN    2022      Delivery Information for Girl Tiffanie Lopez    Birth information:  YOB: 2024   Time of birth: 1:37 PM   Sex: female   Head Delivery Date/Time: 2022  1:37 PM   Delivery type: , Low Transverse   Gestational Age: 39w2d    Delivery Providers    Delivering clinician: Micha Henderson MD           Measurements    Weight:   Length:          Apgars    Living status: Living  Apgars:  1 min.:  5 min.:  10 min.:  15 min.:  20 min.:    Skin color:  1  1       Heart rate:  2  2       Reflex irritability:  2  2       Muscle tone:  2  2       Respiratory effort:  2  2       Total:  9  9       Apgars assigned by: KG         Operative Delivery    Forceps attempted?: No  Vacuum extractor attempted?: No         Shoulder Dystocia    Shoulder dystocia present?: No           Presentation    Presentation: Nba Breech  Position: Middle  Sacrum Transverse           Interventions/Resuscitation    Method: Bulb Suctioning, Tactile Stimulation       Cord    Vessels: 3 vessels  Complications: Nuchal, Knot  Nuchal Intervention: reduced  Number of Loops: 1  Delayed Cord Clamping?: No  Cord Clamped Date/Time: 2022  1:37 PM  Cord Blood Disposition: Sent with Baby  Gases Sent?: No  Stem Cell Collection (by MD): No       Placenta    Placenta delivery date/time: 2022 1338  Placenta removal: Manual removal  Placenta appearance: Intact  Placenta disposition: discarded           Labor Events:       labor: No     Labor Onset Date/Time: 2022 04:00     Dilation Complete Date/Time:         Start Pushing Date/Time:         Start Pushing Date/Time:       Rupture Date/Time:            Rupture type:          Fluid Amount:       Fluid Color:       Fluid Odor:       Membrane Status:                steroids: None     Antibiotics given for GBS: No     Induction:       Indications for induction:        Augmentation:       Indications for augmentation:       Labor complications: None     Additional complications:          Cervical ripening:                     Delivery:      Episiotomy: None     Indication for Episiotomy:       Perineal Lacerations: None Repaired:      Periurethral Laceration:   Repaired:     Labial Laceration:   Repaired:     Sulcus Laceration:   Repaired:     Vaginal Laceration:   Repaired:     Cervical Laceration:   Repaired:     Repair suture:       Repair # of packets:       Last Value - EBL - Nursing (mL):       Sum - EBL - Nursing (mL): 0     Last Value - EBL - Anesthesia (mL):      Calculated QBL (mL):       Vaginal Sweep Performed: Yes     Surgicount Correct: Yes       Other providers:       Anesthesia    Method: Spinal          Details (if applicable):  Trial of Labor No    Categorization: Primary    Priority: Urgent   Indications for : Other (Add Comments)   Incision Type: low  transverse     Additional  information:  Forceps:    Vacuum:    Breech:    Observed anomalies    Other (Comments):           Micha Henderson M.D.   OB/GYN    1/28/2022

## 2022-01-29 LAB
BASOPHILS # BLD AUTO: 0.04 K/UL (ref 0–0.2)
BASOPHILS NFR BLD: 0.5 % (ref 0–1.9)
DIFFERENTIAL METHOD: ABNORMAL
EOSINOPHIL # BLD AUTO: 0.1 K/UL (ref 0–0.5)
EOSINOPHIL NFR BLD: 1.3 % (ref 0–8)
ERYTHROCYTE [DISTWIDTH] IN BLOOD BY AUTOMATED COUNT: 14.1 % (ref 11.5–14.5)
HCT VFR BLD AUTO: 28 % (ref 37–48.5)
HGB BLD-MCNC: 9 G/DL (ref 12–16)
IMM GRANULOCYTES # BLD AUTO: 0.03 K/UL (ref 0–0.04)
IMM GRANULOCYTES NFR BLD AUTO: 0.4 % (ref 0–0.5)
LYMPHOCYTES # BLD AUTO: 1.4 K/UL (ref 1–4.8)
LYMPHOCYTES NFR BLD: 16.7 % (ref 18–48)
MCH RBC QN AUTO: 26.2 PG (ref 27–31)
MCHC RBC AUTO-ENTMCNC: 32.1 G/DL (ref 32–36)
MCV RBC AUTO: 82 FL (ref 82–98)
MONOCYTES # BLD AUTO: 0.4 K/UL (ref 0.3–1)
MONOCYTES NFR BLD: 4.4 % (ref 4–15)
NEUTROPHILS # BLD AUTO: 6.4 K/UL (ref 1.8–7.7)
NEUTROPHILS NFR BLD: 76.7 % (ref 38–73)
NRBC BLD-RTO: 0 /100 WBC
PLATELET # BLD AUTO: 144 K/UL (ref 150–450)
PMV BLD AUTO: 11.5 FL (ref 9.2–12.9)
RBC # BLD AUTO: 3.43 M/UL (ref 4–5.4)
RPR SER QL: NORMAL
WBC # BLD AUTO: 8.37 K/UL (ref 3.9–12.7)

## 2022-01-29 PROCEDURE — 63600175 PHARM REV CODE 636 W HCPCS: Performed by: OBSTETRICS & GYNECOLOGY

## 2022-01-29 PROCEDURE — 25000003 PHARM REV CODE 250: Performed by: OBSTETRICS & GYNECOLOGY

## 2022-01-29 PROCEDURE — 85025 COMPLETE CBC W/AUTO DIFF WBC: CPT | Performed by: OBSTETRICS & GYNECOLOGY

## 2022-01-29 PROCEDURE — 99231 SBSQ HOSP IP/OBS SF/LOW 25: CPT | Mod: ,,, | Performed by: OBSTETRICS & GYNECOLOGY

## 2022-01-29 PROCEDURE — 36415 COLL VENOUS BLD VENIPUNCTURE: CPT | Performed by: OBSTETRICS & GYNECOLOGY

## 2022-01-29 PROCEDURE — 11000001 HC ACUTE MED/SURG PRIVATE ROOM

## 2022-01-29 PROCEDURE — 99231 PR SUBSEQUENT HOSPITAL CARE,LEVL I: ICD-10-PCS | Mod: ,,, | Performed by: OBSTETRICS & GYNECOLOGY

## 2022-01-29 RX ORDER — NITROFURANTOIN 25; 75 MG/1; MG/1
100 CAPSULE ORAL EVERY 12 HOURS
Status: DISCONTINUED | OUTPATIENT
Start: 2022-01-29 | End: 2022-01-30 | Stop reason: HOSPADM

## 2022-01-29 RX ORDER — OXYCODONE AND ACETAMINOPHEN 5; 325 MG/1; MG/1
1 TABLET ORAL EVERY 4 HOURS PRN
Status: DISCONTINUED | OUTPATIENT
Start: 2022-01-29 | End: 2022-01-30 | Stop reason: HOSPADM

## 2022-01-29 RX ORDER — NAPROXEN 500 MG/1
500 TABLET ORAL EVERY 8 HOURS PRN
Status: DISCONTINUED | OUTPATIENT
Start: 2022-01-29 | End: 2022-01-30 | Stop reason: HOSPADM

## 2022-01-29 RX ORDER — MUPIROCIN 20 MG/G
OINTMENT TOPICAL 2 TIMES DAILY
Status: DISCONTINUED | OUTPATIENT
Start: 2022-01-29 | End: 2022-01-30 | Stop reason: HOSPADM

## 2022-01-29 RX ORDER — CEFTRIAXONE 1 G/1
1 INJECTION, POWDER, FOR SOLUTION INTRAMUSCULAR; INTRAVENOUS
Status: DISCONTINUED | OUTPATIENT
Start: 2022-01-29 | End: 2022-01-29

## 2022-01-29 RX ORDER — OXYCODONE AND ACETAMINOPHEN 10; 325 MG/1; MG/1
1 TABLET ORAL EVERY 4 HOURS PRN
Status: DISCONTINUED | OUTPATIENT
Start: 2022-01-29 | End: 2022-01-30 | Stop reason: HOSPADM

## 2022-01-29 RX ORDER — PRENATAL WITH FERROUS FUM AND FOLIC ACID 3080; 920; 120; 400; 22; 1.84; 3; 20; 10; 1; 12; 200; 27; 25; 2 [IU]/1; [IU]/1; MG/1; [IU]/1; MG/1; MG/1; MG/1; MG/1; MG/1; MG/1; UG/1; MG/1; MG/1; MG/1; MG/1
1 TABLET ORAL DAILY
Status: DISCONTINUED | OUTPATIENT
Start: 2022-01-30 | End: 2022-01-30 | Stop reason: HOSPADM

## 2022-01-29 RX ORDER — SIMETHICONE 80 MG
1 TABLET,CHEWABLE ORAL EVERY 6 HOURS PRN
Status: DISCONTINUED | OUTPATIENT
Start: 2022-01-29 | End: 2022-01-30 | Stop reason: HOSPADM

## 2022-01-29 RX ADMIN — CEFTRIAXONE SODIUM 1 G: 1 INJECTION, POWDER, FOR SOLUTION INTRAMUSCULAR; INTRAVENOUS at 02:01

## 2022-01-29 RX ADMIN — NITROFURANTOIN (MONOHYDRATE/MACROCRYSTALS) 100 MG: 75; 25 CAPSULE ORAL at 09:01

## 2022-01-29 RX ADMIN — DOCUSATE SODIUM 200 MG: 100 CAPSULE ORAL at 09:01

## 2022-01-29 RX ADMIN — ACETAMINOPHEN 650 MG: 325 TABLET ORAL at 12:01

## 2022-01-29 RX ADMIN — ACETAMINOPHEN 650 MG: 325 TABLET ORAL at 06:01

## 2022-01-29 RX ADMIN — SODIUM CHLORIDE: 0.9 INJECTION, SOLUTION INTRAVENOUS at 06:01

## 2022-01-29 RX ADMIN — OXYCODONE HYDROCHLORIDE AND ACETAMINOPHEN 1 TABLET: 5; 325 TABLET ORAL at 07:01

## 2022-01-29 RX ADMIN — MUPIROCIN: 20 OINTMENT TOPICAL at 09:01

## 2022-01-29 NOTE — NURSING
"Mom was upset earlier.  [facility]  [facility] #[id number] for [place].  Mom states she was upset concerning adoption plan but reassured us that this is her decision and she is standing by her decision.  Discussed with pt adoptive mom wanting to "room in" with infant tonight and pt verbalized she is OK with her doing that but wishes to see infant prior to bringing to adoptive mom  "

## 2022-01-29 NOTE — PROGRESS NOTES
[facility]  Obstetrics  Postpartum Progress Note    Patient Name: Tiffanie Lopez  MRN: [id number]  Admission Date: 2022  Hospital Length of Stay: 1 days  Attending Physician: Micha Henderson MD  Primary Care Provider: Primary Doctor No    Subjective:     Principal Problem: delivery delivered    Hospital Course:  No notes on file    Interval History: PPD #1 following CS for breech.    She is doing well this morning. She is tolerating a regular diet without nausea or vomiting. She is voiding spontaneously. She is ambulating. She has passed flatus, and has a BM. Vaginal bleeding is mild. She denies fever or chills. Abdominal pain is mild and controlled with oral medications. She Is not breastfeeding.     Objective:     Vital Signs (Most Recent):  Temp: 97.8 °F (36.6 °C) (22)  Pulse: (!) 59 (22)  Resp: 16 (22)  BP: 115/63 (22)  SpO2: 97 % (22) Vital Signs (24h Range):  Temp:  [97.3 °F (36.3 °C)-98.2 °F (36.8 °C)] 97.8 °F (36.6 °C)  Pulse:  [50-76] 59  Resp:  [14-16] 16  SpO2:  [92 %-98 %] 97 %  BP: (103-134)/(60-83) 115/63     Weight: 61.2 kg (134 lb 13.3 oz)  Body mass index is 24.5 kg/m².      Intake/Output Summary (Last 24 hours) at 2022 0853  Last data filed at 2022 0200  Gross per 24 hour   Intake 1000 ml   Output 1648 ml   Net -648 ml         Significant Labs:  Lab Results   Component Value Date    GROUPTRH A POS 2022     Recent Labs   Lab 22   HGB 9.0*   HCT 28.0*       CBC:   Recent Labs   Lab 22   WBC 8.37   RBC 3.43*   HGB 9.0*   HCT 28.0*   *   MCV 82   MCH 26.2*   MCHC 32.1       Physical Exam:   Constitutional: She is oriented to person, place, and time. She appears well-developed and well-nourished. No distress.    HENT:   Head: Normocephalic and atraumatic.    Eyes: Pupils are equal, round, and reactive to light. EOM are normal.     Cardiovascular: Normal rate and  regular rhythm.     Pulmonary/Chest: Effort normal. No respiratory distress.        Abdominal: Soft. She exhibits no distension. There is abdominal tenderness.             Musculoskeletal: Normal range of motion.       Neurological: She is alert and oriented to person, place, and time.    Skin: Skin is warm and dry. She is not diaphoretic.        Assessment/Plan:     26 y.o. female  for:    *  delivery delivered  PPD#1 following CS for breech. Currently doing well.   - continue routine PP care            Tam Langston MD  Obstetrics  [facility]

## 2022-01-29 NOTE — PLAN OF CARE
SW met with patient to complete discharge planning assessment with assistance from  [employer] ID# [id number]. Pt was easily engaged and education on the role of  was provided. Emotional support provided throughout assessment. Pt has a planned adoption for  baby girl through  [facility]/ Ruth Williamson  ( [phone]). Pt advised adoptive parents have not decided on a name for the  or a pediatrician at this time. Pt informed SW she prefers to discharge separately from the  and would like for adoptive parents Annetta and Mark Sparks to carvajal with  while in the hospital. Pt's  Rox Richards will provide transportation for patient at discharge. Pt reported no needs at this time. SW will continue to follow pt's transition of care.          22 1854   OB Discharge Planning Assessment   Assessment Type Discharge Planning Assessment   Source of Information patient; utilized  ( [employer] ID# [id number])   Insurance Uninsured   Uninsured   (Medicaid Pending)   Lives With child(patrick), dependent   Name(s) of Who Lives With Patient Five year old son   Other Contacts Names and Numbers Rox Richards [phone]   Transportation Anticipated family or friend will provide   Mother Plans for Involvement in Infant Care Following Delivery? no   Adoption Type open    Involved yes  (Rox Richards, [facility])   Discharge Plan A Home with family

## 2022-01-29 NOTE — NURSING
Patient resting comfortably.  Advised Dr Philippe of patient's urine cx results.  meds ordered. Care of patient given to Yvonne ROSS on mother baby.

## 2022-01-29 NOTE — PLAN OF CARE
POC reviewed with the pt, verbalized understanding using video  Jordin #[id number]. VSS. IV fluids infusing 125 ml/hr. FC removed at 2am, well tolerated by pt. Instructed pt to call if want to go to the bathroom or for any assistance. Call bell within reach. Safety maintained at all times. Will continue to monitor.

## 2022-01-29 NOTE — SUBJECTIVE & OBJECTIVE
Interval History: PPD #1 following CS for breech.    She is doing well this morning. She is tolerating a regular diet without nausea or vomiting. She is voiding spontaneously. She is ambulating. She has passed flatus, and has a BM. Vaginal bleeding is mild. She denies fever or chills. Abdominal pain is mild and controlled with oral medications. She Is not breastfeeding.     Objective:     Vital Signs (Most Recent):  Temp: 97.8 °F (36.6 °C) (01/29/22 0157)  Pulse: (!) 59 (01/29/22 0157)  Resp: 16 (01/29/22 0157)  BP: 115/63 (01/29/22 0157)  SpO2: 97 % (01/29/22 0157) Vital Signs (24h Range):  Temp:  [97.3 °F (36.3 °C)-98.2 °F (36.8 °C)] 97.8 °F (36.6 °C)  Pulse:  [50-76] 59  Resp:  [14-16] 16  SpO2:  [92 %-98 %] 97 %  BP: (103-134)/(60-83) 115/63     Weight: 61.2 kg (134 lb 13.3 oz)  Body mass index is 24.5 kg/m².      Intake/Output Summary (Last 24 hours) at 1/29/2022 0853  Last data filed at 1/29/2022 0200  Gross per 24 hour   Intake 1000 ml   Output 1648 ml   Net -648 ml         Significant Labs:  Lab Results   Component Value Date    GROUPTRH A POS 01/28/2022     Recent Labs   Lab 01/29/22 0441   HGB 9.0*   HCT 28.0*       CBC:   Recent Labs   Lab 01/29/22 0441   WBC 8.37   RBC 3.43*   HGB 9.0*   HCT 28.0*   *   MCV 82   MCH 26.2*   MCHC 32.1       Physical Exam:   Constitutional: She is oriented to person, place, and time. She appears well-developed and well-nourished. No distress.    HENT:   Head: Normocephalic and atraumatic.    Eyes: Pupils are equal, round, and reactive to light. EOM are normal.     Cardiovascular: Normal rate and regular rhythm.     Pulmonary/Chest: Effort normal. No respiratory distress.        Abdominal: Soft. She exhibits no distension. There is abdominal tenderness.             Musculoskeletal: Normal range of motion.       Neurological: She is alert and oriented to person, place, and time.    Skin: Skin is warm and dry. She is not diaphoretic.

## 2022-01-29 NOTE — NURSING
Report received from Amanda ROSS night shift on mother baby.   Delivered by  22 @ 1337 NS running at 125 ml/hr per the Amanda ROSS the patient's catheter was pulled at 0200 this AM and as of shift change the patient had not urinated. @ 0715 the patient walked to the restroom and urinated 800 ml without difficulty. Per the patient she is not having pain at this time.   # [id number] used to communicate with the patient.  Fundus firm without massage at the umbilicus lochia rubra scant. Incisional bandage clean dry and intact. @ 0810 NS stopped. Will continue to monitor the patient.

## 2022-01-29 NOTE — PLAN OF CARE
BALTA received letter from  Ruth Williamson via email documenting pt's pending adoption and hospital plan.     BALTA printed and delivered letter to LESLYE Vazquez.       SW to follow up.

## 2022-01-30 VITALS
BODY MASS INDEX: 24.66 KG/M2 | TEMPERATURE: 99 F | SYSTOLIC BLOOD PRESSURE: 109 MMHG | DIASTOLIC BLOOD PRESSURE: 74 MMHG | OXYGEN SATURATION: 100 % | HEIGHT: 62 IN | HEART RATE: 59 BPM | WEIGHT: 134 LBS | RESPIRATION RATE: 18 BRPM

## 2022-01-30 PROCEDURE — 99238 HOSP IP/OBS DSCHRG MGMT 30/<: CPT | Mod: ,,, | Performed by: OBSTETRICS & GYNECOLOGY

## 2022-01-30 PROCEDURE — 25000003 PHARM REV CODE 250: Performed by: OBSTETRICS & GYNECOLOGY

## 2022-01-30 PROCEDURE — 99238 PR HOSPITAL DISCHARGE DAY,<30 MIN: ICD-10-PCS | Mod: ,,, | Performed by: OBSTETRICS & GYNECOLOGY

## 2022-01-30 RX ORDER — NITROFURANTOIN 25; 75 MG/1; MG/1
100 CAPSULE ORAL 2 TIMES DAILY
Qty: 14 CAPSULE | Refills: 0 | Status: SHIPPED | OUTPATIENT
Start: 2022-01-30

## 2022-01-30 RX ORDER — IBUPROFEN 600 MG/1
600 TABLET ORAL 3 TIMES DAILY
Qty: 40 TABLET | Refills: 2 | Status: SHIPPED | OUTPATIENT
Start: 2022-01-30

## 2022-01-30 RX ORDER — OXYCODONE AND ACETAMINOPHEN 5; 325 MG/1; MG/1
1 TABLET ORAL EVERY 4 HOURS PRN
Qty: 30 TABLET | Refills: 0 | Status: SHIPPED | OUTPATIENT
Start: 2022-01-30

## 2022-01-30 RX ADMIN — MUPIROCIN: 20 OINTMENT TOPICAL at 07:01

## 2022-01-30 RX ADMIN — DOCUSATE SODIUM 200 MG: 100 CAPSULE ORAL at 07:01

## 2022-01-30 RX ADMIN — OXYCODONE HYDROCHLORIDE AND ACETAMINOPHEN 1 TABLET: 5; 325 TABLET ORAL at 07:01

## 2022-01-30 RX ADMIN — NITROFURANTOIN (MONOHYDRATE/MACROCRYSTALS) 100 MG: 75; 25 CAPSULE ORAL at 07:01

## 2022-01-30 NOTE — DISCHARGE SUMMARY
Delivery Discharge Summary  Obstetrics      Primary OB Clinician: Kali Prescott MD    Admission date: 2022  Discharge date: 2022    Admit Dx:   Patient Active Problem List   Diagnosis    Breech presentation     delivery delivered     Discharge Dx:   Patient Active Problem List   Diagnosis    Breech presentation     delivery delivered       Procedure: , due to Breech    Hospital Course:  Pt is a 26 y.o. now  by , due to breech, POD # 2 was admitted on 2022. On initial assessment, vital signs were stable and physical exam was Normal.  Patient was subsequently admitted to labor and delivery unit with signed consents.  Patient subsequently delivered a viable infant, please see delivery information below or full delivery note for further details. Pt was in stable condition post delivery and was transferred to the Mother-Baby Unit in a stable condition. Her postpartum course was uncomplicated. On discharge day, patient's pain is well  controlled with oral pain medications. Pt is tolerating ambulation without SOB or CP, and PO diet without N/V. Reports lochia is minimal in degree. Denies any HA, vision changes, F/C, LE swelling. Denies any breast pain/soreness.  Pt in stable condition and ready for discharge, instructed to continue PNV, Iron supplement, and to follow up in the OB clinic in 1 and 4-6 weeks with Dr. Henderson.      Delivery:    Episiotomy: None   Lacerations: None   Repair suture:     Repair # of packets:     Blood loss (ml):       Birth information:  YOB: 2024   Time of birth: 1:37 PM   Sex: female   Delivery type: , Low Transverse   Gestational Age: 39w2d    Delivery Clinician:      Other providers:       Additional  information:  Forceps:    Vacuum:    Breech:    Observed anomalies      Living?:           APGARS  One minute Five minutes Ten minutes   Skin color:         Heart rate:         Grimace:         Muscle tone:          Breathing:         Totals: 9  9        Placenta: Delivered:       appearance      Patient Instructions:   Current Discharge Medication List      START taking these medications    Details   ibuprofen (ADVIL,MOTRIN) 600 MG tablet Take 1 tablet (600 mg total) by mouth 3 (three) times daily.  Qty: 40 tablet, Refills: 2      nitrofurantoin, macrocrystal-monohydrate, (MACROBID) 100 MG capsule Take 1 capsule (100 mg total) by mouth 2 (two) times daily.  Qty: 14 capsule, Refills: 0      oxyCODONE-acetaminophen (PERCOCET) 5-325 mg per tablet Take 1 tablet by mouth every 4 (four) hours as needed for Pain.  Qty: 30 tablet, Refills: 0    Comments: Quantity prescribed more than 7 day supply? No             No discharge procedures on file.  D/C pt. To home in stable condition  Reg diet  Ad noemi activity with pelvic rest x 6 wks  Call for fever >101, heavy vag bleeding, pain uncontrolled w/ pain meds  F/u in office in 1 week for staple removal and  6-8wks for final check-up  Motrin 600mg, Percocet 5/325mg, Macrobid  Kali Philippe MD

## 2022-01-30 NOTE — ANESTHESIA POSTPROCEDURE EVALUATION
Anesthesia Post Evaluation    Patient: Tiffanie Lopez    Procedure(s) Performed: Procedure(s) (LRB):   SECTION (N/A)    Final Anesthesia Type: spinal      Patient location during evaluation: labor & delivery  Patient participation: Yes- Able to Participate  Level of consciousness: awake and alert, awake and oriented  Post-procedure vital signs: reviewed and stable  Pain management: adequate  Airway patency: patent  KELLY mitigation strategies: Multimodal analgesia    Anesthetic complications: no      Cardiovascular status: hemodynamically stable and blood pressure returned to baseline  Respiratory status: unassisted, spontaneous ventilation and room air  Hydration status: euvolemic  Follow-up not needed.  Comments: No catheter in back.  No headache/neckache/backache.  Full return of neurological function.  Able to urinate, ambulate.  Advised patient to report any new problems of back pain, especially with fever or decreasing bladder function occurring during coming days to weeks.           Vitals Value Taken Time   /74 22 0755   Temp 37.1 °C (98.8 °F) 22 0755   Pulse 59 22 0755   Resp 18 22 0755   SpO2 100 % 22 0755         No case tracking events are documented in the log.      Pain/Nelida Score: Pain Rating Prior to Med Admin: 4 (2022  7:55 AM)  Pain Rating Post Med Admin: 2 (2022  8:45 PM)

## 2022-01-30 NOTE — PROGRESS NOTES
[facility]  Obstetrics  Postpartum Progress Note    Patient Name: Tiffanie Lopez  MRN: [id number]  Admission Date: 2022  Hospital Length of Stay: 2 days  Attending Physician: iMcha Henderson MD  Primary Care Provider: Primary Doctor No    Subjective:     Principal Problem: delivery delivered    Hospital Course:  No notes on file    Interval History: PPD#2 for CS for breech    She is doing well this morning. She is tolerating a regular diet without nausea or vomiting. She is voiding spontaneously. She is ambulating. She has passed flatus, and has not a BM. Vaginal bleeding is mild. She denies fever or chills. Abdominal pain is mild and controlled with oral medications. She Is not breastfeeding.     Objective:     Vital Signs (Most Recent):  Temp: 98.8 °F (37.1 °C) (22)  Pulse: (!) 59 (22)  Resp: 18 (22)  BP: 109/74 (22)  SpO2: 100 % (22) Vital Signs (24h Range):  Temp:  [97.8 °F (36.6 °C)-98.8 °F (37.1 °C)] 98.8 °F (37.1 °C)  Pulse:  [55-70] 59  Resp:  [16-18] 18  SpO2:  [98 %-100 %] 100 %  BP: ()/(68-79) 109/74     Weight: 60.8 kg (134 lb)  Body mass index is 24.51 kg/m².    No intake or output data in the 24 hours ending 22 0924      Significant Labs:  Lab Results   Component Value Date    GROUPTRH A POS 2022     Recent Labs   Lab 22  0441   HGB 9.0*   HCT 28.0*       I have personallly reviewed all pertinent lab results from the last 24 hours.    Physical Exam:   Constitutional: She is oriented to person, place, and time. She appears well-developed and well-nourished. No distress.    HENT:   Head: Normocephalic and atraumatic.    Eyes: Pupils are equal, round, and reactive to light. EOM are normal.     Cardiovascular: Normal rate, regular rhythm and normal heart sounds.     Pulmonary/Chest: Effort normal. No respiratory distress.        Abdominal: Soft. There is no abdominal tenderness.              Musculoskeletal: Normal range of motion.       Neurological: She is alert and oriented to person, place, and time.    Skin: Skin is warm and dry. She is not diaphoretic.        Assessment/Plan:     26 y.o. female  for:    *  delivery delivered  PPD#2 following CS for breech. Currently doing well. Urine growing GNR   - continue routine PP care   - continue macrobid        Disposition: As patient meets milestones, will plan to discharge today/tomorrow.    Tam Langston MD  Obstetrics  [facility]

## 2022-01-30 NOTE — NURSING
Pt decided to be discharged today instead of waiting until tomorrow.  Order received from Dr Prescott for discharge.  Discharge instructions given to pt with [facility]  Sophie #[id number]. Pt verbalized understanding

## 2022-01-30 NOTE — PLAN OF CARE
Problem: Adjustment to Role Transition (Postpartum  Delivery)  Goal: Successful Maternal Role Transition  Outcome: Ongoing, Progressing     Problem: Adult Inpatient Plan of Care  Goal: Absence of Hospital-Acquired Illness or Injury  Outcome: Adequate for Care Transition  Goal: Optimal Comfort and Wellbeing  Outcome: Adequate for Care Transition     Problem: Infection  Goal: Absence of Infection Signs and Symptoms  Outcome: Adequate for Care Transition     Problem: Bleeding (Postpartum  Delivery)  Goal: Hemostasis  Outcome: Adequate for Care Transition

## 2022-01-30 NOTE — SUBJECTIVE & OBJECTIVE
Interval History: PPD#2 for CS for breech    She is doing well this morning. She is tolerating a regular diet without nausea or vomiting. She is voiding spontaneously. She is ambulating. She has passed flatus, and has not a BM. Vaginal bleeding is mild. She denies fever or chills. Abdominal pain is mild and controlled with oral medications. She Is not breastfeeding.     Objective:     Vital Signs (Most Recent):  Temp: 98.8 °F (37.1 °C) (01/30/22 0755)  Pulse: (!) 59 (01/30/22 0755)  Resp: 18 (01/30/22 0755)  BP: 109/74 (01/30/22 0755)  SpO2: 100 % (01/30/22 0755) Vital Signs (24h Range):  Temp:  [97.8 °F (36.6 °C)-98.8 °F (37.1 °C)] 98.8 °F (37.1 °C)  Pulse:  [55-70] 59  Resp:  [16-18] 18  SpO2:  [98 %-100 %] 100 %  BP: ()/(68-79) 109/74     Weight: 60.8 kg (134 lb)  Body mass index is 24.51 kg/m².    No intake or output data in the 24 hours ending 01/30/22 0924      Significant Labs:  Lab Results   Component Value Date    GROUPTRH A POS 01/28/2022     Recent Labs   Lab 01/29/22  0441   HGB 9.0*   HCT 28.0*       I have personallly reviewed all pertinent lab results from the last 24 hours.    Physical Exam:   Constitutional: She is oriented to person, place, and time. She appears well-developed and well-nourished. No distress.    HENT:   Head: Normocephalic and atraumatic.    Eyes: Pupils are equal, round, and reactive to light. EOM are normal.     Cardiovascular: Normal rate, regular rhythm and normal heart sounds.     Pulmonary/Chest: Effort normal. No respiratory distress.        Abdominal: Soft. There is no abdominal tenderness.             Musculoskeletal: Normal range of motion.       Neurological: She is alert and oriented to person, place, and time.    Skin: Skin is warm and dry. She is not diaphoretic.

## 2022-01-30 NOTE — NURSING
Pt called RN to room.  Pt requesting infant to come to room (infant presently in room with adoptive mom).  Spoke to pt with  concerning discharge.  Pt states she would like to stay until tomorrow so the social workers will be present.  Pt would also like to stay to spend a little extra time with infant before surrendering the infant.  Pt tearful and states this is a hard and scary decision but reassures this is her decision and she is going through with the adoption plans.  Marco A RN notified of pt request for infant to come to room

## 2022-01-30 NOTE — PLAN OF CARE
BALTA received a message from pt's  Rox Richards LCSW advising pt and adoptive parents have chosen a name for  ( Paris Gtz). BALTA was also informed that patient was unsure of discharge date and time, but has requested to discharge at the same time as .     BALTA made telephonic contact with mother/baby and nursery staff to identify pt and 's discharge date. Pt and 's discharge date unknown at this time. Nursing staff advised Pt is possibly requesting  to discharge at the same time as . BALTA requested if the patient receives discharge orders today, that SW is contacted.      BALTA made attempt of telephonic contact with patient [phone] for follow up, to confirm wishes for discharge, complete discharge planning assessment and assess any additional needs. BALTA via  Margraita ID# [id number] left a voice message requesting a return call.       BALTA contacted pt's  Rox Richards LCSW  to confirm receipt of message. Pt's  again informed SW that patient would like to discharge with . BALTA advised Ms. Richards that contact will be made with patient to obtain wishes for discharge. Ms. Richards verbalized understanding.       Update: SW received a  Release of Information form from HOLLY Russell signed by patient.       SW to follow up.

## 2022-01-30 NOTE — ASSESSMENT & PLAN NOTE
PPD#2 following CS for breech. Currently doing well. Urine growing GNR   - continue routine PP care   - continue macrobid

## 2022-01-30 NOTE — DISCHARGE INSTRUCTIONS
"Instrucciones Para Dean de Chloe    Instrucciones a Seguir    Dieta regular  Actividad: Aumentarntar gradualmente  Isabella: Regadera o Ashlyn  Restricciones: No levantar nada pesado  Cuidado Personal: No tampones o duchas vaginales  Actividad Sexual: Paulette relaciones sexuales  Planificacion Familiar: Consulta con jacobson medico  Cuidado de los Senos: Use un sosten de soporte  Regresar al trabajo/escuela: Cuando jacobson medico le indique    Cuando debe llamar al Doctor    *Fiebre de 100.4 o mas alto  *Nausea/Vomito persistente  *Secrecion de la incision  *Aren sangrado vaginal o coagulos  *Inflamacion/dolor en los brazos o las piernas  *Severo dolor de precious, vision borrosa or desmayos  *Frequencia/ardor urinario  *Senales de depresion post-parto        La Depresion Post-Parto    Usted acaba de tener un sadia'.  A pesar de que sabe que deberia sentirse emocionada y low, lo que seinte son ganas de llorar sin motivo y tiene dificultades para realizar janina tareas diarias.  Usted pasa la mayor parte del tiempo rasta, cansada y desesperanzada, y hasta podria sentirse avergonzada o culpable.  Soheila lo que le esta sucediendo no es culpa suya, y puede sentirse mejor.  Hable con jacobson medico para que la ayude.     La Depresion Despues del Parto    Maikol vez sienta cansancio y ganas de llorar dustin despues de dean a erlin.  Esta etapa melancolica, denominada "baby blues", puede hacerla sentir rasta y desesperanzada, o temerosa de que algo adrien le vaya a pasar al sadia.  Algunas mujeres hasta llegan a poner en carly el que puedan ser buenas madres.    Que' es la Depresion?    La depresion es un trastorno del animo que afecta jacobson manera de pernsar y de sentir.  El sintoma mas frecuente es un sentimiento de honda tristeza: tambien podria causane la sensacion de que ya no puede sobrellevar la jey.    Otros sintomas incluyen:      * Ganar o perder mucho peso  * Dormir en exceso o demasiado poco  * Estar cansada todo el tiempo  * Sentirse inquieta  * " Tener miedo de querer herir al sadia'  * No tener interes por el sadia'  * Sentirse inutil o culpable   * No encontrar placer en las cosas que solia gustarle hacer  * Dificultades para pensar con claridad o ada decisiones  * Pensar sobre la muerte o el suicidio     Que' Causa la Depresion Postparto?    La causea exacta de la depresion postarto se desconce, aunque posiblemente es el resultado de los cambios hormonales que suceden arun y despues del parto.  Tambien puede deberse al cansancio que le causan las exigencias del sadia' y el proceso de adaptacion a jacobson maternidad.  Todos estos factores podrian deprimirla.  En algunos casos, existe remi predisposicion genetica a devi tipo de depresion.    La depresion puede tratarse    Lo magallanes es que hay muchas maneras de tratar la depresion postparo.  Emprenda el primer paso para sentirse mejor hablando con jacobson medico.     Recursos    * National Institutes of Mental Health-- 646-482-9271     www.nimh.nih.gov    * National [place] on Mental Illness -- 643.750.2370     Www.desi.org    * Mental Health Tammy --  737.550.7858     Www.[facility].org    * National Suidide Hotline -- 871.615.1725    2763-5316 [employer]  Todos los derechos reservados.  Esta informacion no pretende sustituir las atencion medica profesional.  Solo jacobson medico puede diagnosticar y tratar un problema de noe.       Patient Discharge Instructions for Postpartum Women    Resume Regular Diet  Increase activity gradually, no heavy lifting  Shower  No tampons, douching or sexual intercourse.  Discuss birth control options with your physician.  Wear a support bra  Return to work/school when you've been cleared by a physician    Call your physician if     *Fever of 100.4 or higher  *Persistent nausea/ vomiting  *Incisional drainage  *Heavy vaginal bleeding or large clots (Heavy bleeding is soaking 1 pad in an hour)  *Swelling and pain in arms or legs  *Severe headaches, blurred vision or  "fainting  *Shortness of breath  *Frequency and burning with urination  *Signs of postpartum depression, discuss these signs with your physician    Call lactation services for questions regarding feeding, nipple and breast care, and general questions about lactation.  They can be reached at [phone]         Understanding Postpartum Depression    You've just had a baby.  You know you should be excited and happy.  But instead you find yourself crying for no reason.  You may have trouble coping with your daily tasks.  You feel sad, tired, and hopeless most of the time.  You may even feel ashamed or guilty.  But what you're going through is not your fault and you can feel better.  Talk to your doctor.  He or she can help.    Depression After Childbirth    You may be weepy and tired right after giving birth.  These feelings are normal.  They're sometimes called the "baby blues."  These blues go away 2-3 weeks.  However, postpartum (meaning "after birth") depression lasts much longer and is more sever than the "baby blues."  It can make you feel sad and hopeless.  You may also fear that your baby will be harmed and worry about being a bad mother.      What is Depression?    Depression is a mood disorder that affects the way you think and feel.  The most common symptom is a feeling of deep sadness.  You may also feel as if you just can't cope with life.    Other symptoms include:      * Gaining or loosing weight  * Sleeping too much or too little  * Feeling tired all the time  * Feeling restless  * Fears of harming your baby   * Lack of interest in your baby  * Feeling worthless or guilty  * No longer finding pleasure in things you used to  * Having trouble thinking clearly or making decisions  * Thoughts of hurting yourself or your baby    What Causes Postpartum Depression    The exact causes of postpartum depression isn't known.  It may be due to changes in your hormones during and after childbirth.  You may also be " tired from caring for your baby and adjusting to being a mother.  All these factors may make you feel depressed.  In some cases, your genes may also play a role.    Depression Can Be Treated    The good news is that there are many ways to treat postpartum depression.  Talking to your doctor is the first step toward feeling better.    Resources:    * National [place] of Mental Health  -- 108.872.9920    www.nimh.nih.gov    * National [place] on Mental Illness --272.766.5468    Www.desi.org    * Mental Health Tammy -- 408.879.1442     Www.[facility].org    * National Suicide Hotline --940.898.6739 (800-SUICIDE)    5378-0878 [employer]  All rights reserved.  This information is not intended as a substitute for professional medical care.  Always follow up with your healthcare professional's instructions.

## 2022-01-31 ENCOUNTER — TELEPHONE (OUTPATIENT)
Dept: OBSTETRICS AND GYNECOLOGY | Facility: CLINIC | Age: 27
End: 2022-01-31
Payer: MEDICAID

## 2022-01-31 LAB
BACTERIA UR CULT: ABNORMAL
C TRACH DNA SPEC QL NAA+PROBE: NOT DETECTED
N GONORRHOEA DNA SPEC QL NAA+PROBE: NOT DETECTED
RUBV IGG SER-ACNC: 74.3 IU/ML
RUBV IGG SER-IMP: REACTIVE

## 2022-01-31 NOTE — TELEPHONE ENCOUNTER
Spoke to cee wood with lab about the bv swab that was collected on this pt but was labeled incorrectly.  will be informed.

## 2022-02-01 LAB
BACTERIAL VAGINOSIS DNA: POSITIVE
CANDIDA GLABRATA DNA: NEGATIVE
CANDIDA KRUSEI DNA: NEGATIVE
CANDIDA RRNA VAG QL PROBE: NEGATIVE
HBV SURFACE AG SERPL QL IA: NEGATIVE
T VAGINALIS RRNA GENITAL QL PROBE: NEGATIVE

## 2022-02-02 ENCOUNTER — TELEPHONE (OUTPATIENT)
Dept: OBSTETRICS AND GYNECOLOGY | Facility: CLINIC | Age: 27
End: 2022-02-02
Payer: MEDICAID

## 2022-02-02 RX ORDER — METRONIDAZOLE 500 MG/1
500 TABLET ORAL 3 TIMES DAILY
Qty: 21 TABLET | Refills: 0 | Status: SHIPPED | OUTPATIENT
Start: 2022-02-02 | End: 2022-02-09

## 2022-02-02 RX ORDER — CEPHALEXIN 250 MG/1
250 CAPSULE ORAL 4 TIMES DAILY
Qty: 40 CAPSULE | Refills: 0 | Status: SHIPPED | OUTPATIENT
Start: 2022-02-02 | End: 2022-02-12

## 2022-02-02 NOTE — TELEPHONE ENCOUNTER
Affirm resulted  Positive for BV  Rx for flagyl sent to pharmacy    Also       Urine culture result was reviewed:    Positive     DX=> UTI     Rx for antibiotic sent to pharmacy  Please inform patient.   Gerson Henderson M.D.   OB/GYN    2/2/2022      Micha Henderson M.D.   OB/GYN

## 2022-02-02 NOTE — TELEPHONE ENCOUNTER
Called patient back and informed her about results. Patient verbalized understanding and had no further questions.

## 2022-02-04 LAB
FINAL PATHOLOGIC DIAGNOSIS: ABNORMAL
Lab: ABNORMAL

## 2022-02-07 ENCOUNTER — POSTPARTUM VISIT (OUTPATIENT)
Dept: OBSTETRICS AND GYNECOLOGY | Facility: CLINIC | Age: 27
End: 2022-02-07
Payer: MEDICAID

## 2022-02-07 VITALS
SYSTOLIC BLOOD PRESSURE: 104 MMHG | DIASTOLIC BLOOD PRESSURE: 68 MMHG | WEIGHT: 119.06 LBS | BODY MASS INDEX: 21.77 KG/M2

## 2022-02-07 PROCEDURE — 59430 PR CARE AFTER DELIVERY ONLY: ICD-10-PCS | Mod: S$PBB,,, | Performed by: OBSTETRICS & GYNECOLOGY

## 2022-02-07 PROCEDURE — 99999 PR PBB SHADOW E&M-EST. PATIENT-LVL III: CPT | Mod: PBBFAC,,, | Performed by: OBSTETRICS & GYNECOLOGY

## 2022-02-07 PROCEDURE — 99213 OFFICE O/P EST LOW 20 MIN: CPT | Mod: PBBFAC,TH,PO | Performed by: OBSTETRICS & GYNECOLOGY

## 2022-02-07 PROCEDURE — 99999 PR PBB SHADOW E&M-EST. PATIENT-LVL III: ICD-10-PCS | Mod: PBBFAC,,, | Performed by: OBSTETRICS & GYNECOLOGY

## 2022-02-07 NOTE — PROGRESS NOTES
CC: Post-partum follow-up .   S/P  delivery    Tiffanie Lopez is a 26 y.o. female  who presents for post-partum visit.  She is S/P a  Delivery  22  She and the baby are doing well.  No pain.  No fever.   No bowel / bladder complaints.    Delivery MD:Santiago BARNES     Breast Feeding: YES  Depression: NO  Contraception: no method    Pregnancy was complicated by:  No prenatal care  Breech presentation     /68   Wt 54 kg (119 lb 0.8 oz)   Breastfeeding Unknown   BMI 21.77 kg/m²     ROS:  GENERAL: No fever, chills, fatigability.  VULVAR: No pain, no lesions and no itching.  VAGINAL: No relaxation, no itching, no discharge, no abnormal bleeding and no lesions.  ABDOMEN: No abdominal pain. Denies nausea. Denies vomiting. No diarrhea. No constipation  BREAST: Denies pain. No lumps. No discharge.  URINARY: No incontinence, no nocturia, no frequency and no dysuria.  CARDIOVASCULAR: No chest pain. No shortness of breath. No leg cramps.  NEUROLOGICAL: No headaches. No vision changes.    PHYSICAL EXAM:  Exam chaperoned by nurse  ABDOMEN:  Soft, non-tender, non-distended.  Incision clean, intact healing well.  No pelvic today       DX:   S/P  Delivery   Doing well   Continue Pelvic rest   Instructions / precautions reviewed  Contraceptive counseling given   Wants OCP's         PLAN:  RTC in 3 weeks     Micha Henderson M.D.   OB/GYN

## 2022-02-09 LAB
HPV HR 12 DNA SPEC QL NAA+PROBE: POSITIVE
HPV16 AG SPEC QL: NEGATIVE
HPV18 DNA SPEC QL NAA+PROBE: NEGATIVE

## 2022-02-18 ENCOUNTER — TELEPHONE (OUTPATIENT)
Dept: OBSTETRICS AND GYNECOLOGY | Facility: CLINIC | Age: 27
End: 2022-02-18
Payer: MEDICAID

## 2022-02-18 NOTE — TELEPHONE ENCOUNTER
PAP reviewed:    HPV Positive   ASCUS pap      Needs colposcopy     Micha Henderson M.D.   OB/GYN    2/18/2022

## 2024-02-05 NOTE — PLAN OF CARE
SW visited patient to complete [facility]'s  consent for transfer of physical custody of  and release of liability by single mother form.  Екатерина #[id number] provided assistance. Pt completed form and completion was witnessed by BALTA and nurse Ivana BULLARD RN.       SW placed completed form in 's chart ( Baby Girl Ulisses Lopez)    3 = A little assistance